# Patient Record
Sex: FEMALE | URBAN - METROPOLITAN AREA
[De-identification: names, ages, dates, MRNs, and addresses within clinical notes are randomized per-mention and may not be internally consistent; named-entity substitution may affect disease eponyms.]

---

## 2019-12-19 ENCOUNTER — APPOINTMENT (RX ONLY)
Dept: URBAN - METROPOLITAN AREA CLINIC 288 | Facility: CLINIC | Age: 49
Setting detail: DERMATOLOGY
End: 2019-12-19

## 2019-12-19 DIAGNOSIS — B07.0 PLANTAR WART: ICD-10-CM

## 2019-12-19 DIAGNOSIS — D18.0 HEMANGIOMA: ICD-10-CM

## 2019-12-19 DIAGNOSIS — L20.89 OTHER ATOPIC DERMATITIS: ICD-10-CM

## 2019-12-19 PROBLEM — L20.84 INTRINSIC (ALLERGIC) ECZEMA: Status: ACTIVE | Noted: 2019-12-19

## 2019-12-19 PROBLEM — D18.01 HEMANGIOMA OF SKIN AND SUBCUTANEOUS TISSUE: Status: ACTIVE | Noted: 2019-12-19

## 2019-12-19 PROCEDURE — 99202 OFFICE O/P NEW SF 15 MIN: CPT | Mod: 25

## 2019-12-19 PROCEDURE — ? FULL BODY SKIN EXAM - DECLINED

## 2019-12-19 PROCEDURE — ? COUNSELING

## 2019-12-19 PROCEDURE — ? CANTHARIDIN

## 2019-12-19 PROCEDURE — 17110 DESTRUCTION B9 LES UP TO 14: CPT

## 2019-12-19 PROCEDURE — ? PRESCRIPTION

## 2019-12-19 RX ORDER — TRIAMCINOLONE ACETONIDE 0.25 MG/G
CREAM TOPICAL
Qty: 3 | Refills: 3 | Status: ERX | COMMUNITY
Start: 2019-12-19

## 2019-12-19 RX ORDER — BETAMETHASONE DIPROPIONATE 0.5 MG/G
CREAM, AUGMENTED TOPICAL
Qty: 2 | Refills: 3 | Status: ERX | COMMUNITY
Start: 2019-12-19

## 2019-12-19 RX ADMIN — TRIAMCINOLONE ACETONIDE: 0.25 CREAM TOPICAL at 00:00

## 2019-12-19 RX ADMIN — BETAMETHASONE DIPROPIONATE: 0.5 CREAM, AUGMENTED TOPICAL at 00:00

## 2019-12-19 ASSESSMENT — LOCATION DETAILED DESCRIPTION DERM
LOCATION DETAILED: GLABELLA
LOCATION DETAILED: LEFT SUPERIOR MEDIAL UPPER BACK
LOCATION DETAILED: RIGHT MEDIAL INFERIOR CHEST
LOCATION DETAILED: RIGHT PLANTAR FOREFOOT OVERLYING 3RD METATARSAL

## 2019-12-19 ASSESSMENT — LOCATION SIMPLE DESCRIPTION DERM
LOCATION SIMPLE: RIGHT PLANTAR SURFACE
LOCATION SIMPLE: GLABELLA
LOCATION SIMPLE: CHEST
LOCATION SIMPLE: LEFT UPPER BACK

## 2019-12-19 ASSESSMENT — LOCATION ZONE DERM
LOCATION ZONE: TRUNK
LOCATION ZONE: FEET
LOCATION ZONE: FACE

## 2019-12-19 NOTE — PROCEDURE: MIPS QUALITY
Quality 130: Documentation Of Current Medications In The Medical Record: Current Medications with Name, Dosage, Frequency, or Route not Documented, Reason not Given
Quality 226: Preventive Care And Screening: Tobacco Use: Screening And Cessation Intervention: Patient screened for tobacco use and is an ex/non-smoker
Detail Level: Detailed
Quality 110: Preventive Care And Screening: Influenza Immunization: Influenza Immunization Administered during Influenza season
Quality 431: Preventive Care And Screening: Unhealthy Alcohol Use - Screening: Patient screened for unhealthy alcohol use using a single question and scores less than 2 times per year
Quality 128: Preventive Care And Screening: Body Mass Index (Bmi) Screening And Follow-Up Plan: BMI not documented, reason not otherwise specified.

## 2019-12-19 NOTE — PROCEDURE: COUNSELING
Detail Level: Simple
Patient Specific Counseling (Will Not Stick From Patient To Patient): If they become infected or become bothersome can return for destruction
Detail Level: Detailed

## 2019-12-19 NOTE — PROCEDURE: CANTHARIDIN
Curette Before Application?: No
Post-Care Instructions: I reviewed with the patient in detail post-care instructions. The patient understands that the treated areas should be washed off 6 to 8 hours after application.
Consent: The patient's consent was obtained including but not limited to risks of crusting, scabbing, scarring, blistering, darker or lighter pigmentary change, recurrence, incomplete removal and infection.
Strength: Dharmesh
Curette Text: Prior to application of cantharidin the lesions were lightly pared with a curette.
Medical Necessity Information: It is in your best interest to select a reason for this procedure from the list below. All of these items fulfill various CMS LCD requirements except the new and changing color options.
Detail Level: Detailed
Medical Necessity Clause: This procedure was medically necessary because the lesions that were treated were:

## 2020-02-17 ENCOUNTER — APPOINTMENT (RX ONLY)
Dept: URBAN - METROPOLITAN AREA CLINIC 288 | Facility: CLINIC | Age: 50
Setting detail: DERMATOLOGY
End: 2020-02-17

## 2020-02-17 DIAGNOSIS — B07.0 PLANTAR WART: ICD-10-CM

## 2020-02-17 PROCEDURE — ? CANTHARIDIN

## 2020-02-17 PROCEDURE — ? FULL BODY SKIN EXAM - DECLINED

## 2020-02-17 PROCEDURE — 17110 DESTRUCTION B9 LES UP TO 14: CPT

## 2020-02-17 ASSESSMENT — LOCATION SIMPLE DESCRIPTION DERM: LOCATION SIMPLE: RIGHT PLANTAR SURFACE

## 2020-02-17 ASSESSMENT — LOCATION DETAILED DESCRIPTION DERM: LOCATION DETAILED: RIGHT PLANTAR FOREFOOT OVERLYING 3RD METATARSAL

## 2020-02-17 ASSESSMENT — LOCATION ZONE DERM: LOCATION ZONE: FEET

## 2020-02-17 NOTE — PROCEDURE: MIPS QUALITY
Quality 226: Preventive Care And Screening: Tobacco Use: Screening And Cessation Intervention: Patient screened for tobacco use and is an ex/non-smoker
Quality 110: Preventive Care And Screening: Influenza Immunization: Influenza Immunization Administered during Influenza season
Quality 431: Preventive Care And Screening: Unhealthy Alcohol Use - Screening: Patient screened for unhealthy alcohol use using a single question and scores less than 2 times per year
Quality 130: Documentation Of Current Medications In The Medical Record: Current Medications with Name, Dosage, Frequency, or Route not Documented, Reason not Given
Detail Level: Detailed
Quality 128: Preventive Care And Screening: Body Mass Index (Bmi) Screening And Follow-Up Plan: BMI not documented, reason not otherwise specified.

## 2020-02-17 NOTE — PROCEDURE: CANTHARIDIN
Medical Necessity Information: It is in your best interest to select a reason for this procedure from the list below. All of these items fulfill various CMS LCD requirements except the new and changing color options.
Consent: The patient's consent was obtained including but not limited to risks of crusting, scabbing, scarring, blistering, darker or lighter pigmentary change, recurrence, incomplete removal and infection.
Curette Before Application?: No
Medical Necessity Clause: This procedure was medically necessary because the lesions that were treated were:
Strength: Dharmesh
Post-Care Instructions: I reviewed with the patient in detail post-care instructions. The patient understands that the treated areas should be washed off 6 to 8 hours after application.
Curette Text: Prior to application of cantharidin the lesions were lightly pared with a curette.
Detail Level: Detailed

## 2020-06-11 ENCOUNTER — APPOINTMENT (RX ONLY)
Dept: URBAN - METROPOLITAN AREA CLINIC 288 | Facility: CLINIC | Age: 50
Setting detail: DERMATOLOGY
End: 2020-06-11

## 2020-06-11 VITALS — TEMPERATURE: 97.5 F

## 2020-06-11 DIAGNOSIS — B07.0 PLANTAR WART: ICD-10-CM

## 2020-06-11 PROCEDURE — ? FULL BODY SKIN EXAM - DECLINED

## 2020-06-11 PROCEDURE — ? BENIGN DESTRUCTION

## 2020-06-11 PROCEDURE — 17110 DESTRUCTION B9 LES UP TO 14: CPT

## 2020-06-11 ASSESSMENT — LOCATION SIMPLE DESCRIPTION DERM: LOCATION SIMPLE: LEFT PLANTAR SURFACE

## 2020-06-11 ASSESSMENT — LOCATION DETAILED DESCRIPTION DERM: LOCATION DETAILED: LEFT MEDIAL PLANTAR HEEL

## 2020-06-11 ASSESSMENT — LOCATION ZONE DERM: LOCATION ZONE: FEET

## 2020-06-11 NOTE — PROCEDURE: MIPS QUALITY
Quality 128: Preventive Care And Screening: Body Mass Index (Bmi) Screening And Follow-Up Plan: BMI not documented, reason not otherwise specified.
Quality 431: Preventive Care And Screening: Unhealthy Alcohol Use - Screening: Patient screened for unhealthy alcohol use using a single question and scores less than 2 times per year
Detail Level: Detailed
Quality 226: Preventive Care And Screening: Tobacco Use: Screening And Cessation Intervention: Patient screened for tobacco use and is an ex/non-smoker
Quality 130: Documentation Of Current Medications In The Medical Record: Current Medications with Name, Dosage, Frequency, or Route not Documented, Reason not Given
Quality 110: Preventive Care And Screening: Influenza Immunization: Influenza Immunization Administered during Influenza season

## 2020-06-11 NOTE — PROCEDURE: BENIGN DESTRUCTION
Detail Level: Detailed
Anesthesia Volume In Cc: 0.5
Post-Care Instructions: I reviewed with the patient in detail post-care instructions. Patient is to wear sunprotection, and avoid picking at any of the treated lesions. Pt may apply Vaseline to crusted or scabbing areas.
Render Post-Care Instructions In Note?: no
Medical Necessity Clause: This procedure was medically necessary because the lesions that were treated were:
Consent: The patient's consent was obtained including but not limited to risks of crusting, scabbing, blistering, scarring, darker or lighter pigmentary change, recurrence, incomplete removal and infection.
Medical Necessity Information: It is in your best interest to select a reason for this procedure from the list below. All of these items fulfill various CMS LCD requirements except the new and changing color options.
Treatment Number (Will Not Render If 0): 1

## 2020-07-14 ENCOUNTER — APPOINTMENT (RX ONLY)
Dept: URBAN - METROPOLITAN AREA CLINIC 288 | Facility: CLINIC | Age: 50
Setting detail: DERMATOLOGY
End: 2020-07-14

## 2020-07-14 VITALS — TEMPERATURE: 96.3 F

## 2020-07-14 DIAGNOSIS — L71.0 PERIORAL DERMATITIS: ICD-10-CM

## 2020-07-14 PROCEDURE — ? PRESCRIPTION

## 2020-07-14 PROCEDURE — ? FULL BODY SKIN EXAM - DECLINED

## 2020-07-14 PROCEDURE — ? COUNSELING

## 2020-07-14 PROCEDURE — 99213 OFFICE O/P EST LOW 20 MIN: CPT

## 2020-07-14 RX ORDER — DESONIDE 0.5 MG/G
CREAM TOPICAL BID
Qty: 1 | Refills: 2 | Status: ERX | COMMUNITY
Start: 2020-07-14

## 2020-07-14 RX ORDER — MINOCYCLINE HYDROCHLORIDE 100 MG/1
TABLET ORAL BID
Qty: 60 | Refills: 1 | Status: ERX | COMMUNITY
Start: 2020-07-14

## 2020-07-14 RX ADMIN — MINOCYCLINE HYDROCHLORIDE: 100 TABLET ORAL at 00:00

## 2020-07-14 RX ADMIN — DESONIDE: 0.5 CREAM TOPICAL at 00:00

## 2020-07-14 ASSESSMENT — LOCATION ZONE DERM: LOCATION ZONE: FACE

## 2020-07-14 ASSESSMENT — LOCATION DETAILED DESCRIPTION DERM: LOCATION DETAILED: RIGHT SUPERIOR CENTRAL MALAR CHEEK

## 2020-07-14 ASSESSMENT — LOCATION SIMPLE DESCRIPTION DERM: LOCATION SIMPLE: RIGHT CHEEK

## 2020-07-14 NOTE — PROCEDURE: MIPS QUALITY
Quality 431: Preventive Care And Screening: Unhealthy Alcohol Use - Screening: Patient screened for unhealthy alcohol use using a single question and scores less than 2 times per year
Quality 110: Preventive Care And Screening: Influenza Immunization: Influenza Immunization Administered during Influenza season
Quality 226: Preventive Care And Screening: Tobacco Use: Screening And Cessation Intervention: Patient screened for tobacco use and is an ex/non-smoker
Quality 128: Preventive Care And Screening: Body Mass Index (Bmi) Screening And Follow-Up Plan: BMI not documented, reason not otherwise specified.
Quality 130: Documentation Of Current Medications In The Medical Record: Current Medications Documented
Detail Level: Detailed

## 2020-08-11 ENCOUNTER — APPOINTMENT (RX ONLY)
Dept: URBAN - METROPOLITAN AREA CLINIC 288 | Facility: CLINIC | Age: 50
Setting detail: DERMATOLOGY
End: 2020-08-11

## 2020-08-11 VITALS — TEMPERATURE: 96.7 F

## 2020-08-11 DIAGNOSIS — L71.0 PERIORAL DERMATITIS: ICD-10-CM | Status: IMPROVED

## 2020-08-11 PROCEDURE — 99212 OFFICE O/P EST SF 10 MIN: CPT

## 2020-08-11 PROCEDURE — ? ADDITIONAL NOTES

## 2020-08-11 PROCEDURE — ? PRESCRIPTION MEDICATION MANAGEMENT

## 2020-08-11 PROCEDURE — ? PRESCRIPTION

## 2020-08-11 PROCEDURE — ? FULL BODY SKIN EXAM - DECLINED

## 2020-08-11 PROCEDURE — ? COUNSELING

## 2020-08-11 RX ORDER — MINOCYCLINE HYDROCHLORIDE 100 MG/1
TABLET ORAL BID
Qty: 60 | Refills: 1 | Status: CANCELLED
Stop reason: CLARIF

## 2020-08-11 ASSESSMENT — LOCATION DETAILED DESCRIPTION DERM: LOCATION DETAILED: RIGHT SUPERIOR CENTRAL MALAR CHEEK

## 2020-08-11 ASSESSMENT — LOCATION SIMPLE DESCRIPTION DERM: LOCATION SIMPLE: RIGHT CHEEK

## 2020-08-11 ASSESSMENT — LOCATION ZONE DERM: LOCATION ZONE: FACE

## 2020-08-11 NOTE — PROCEDURE: PRESCRIPTION MEDICATION MANAGEMENT
Continue Regimen: Minocycline 100 mg BID for two more weeks then go down for 1 QD.
Render In Strict Bullet Format?: No
Detail Level: Zone

## 2020-08-11 NOTE — PROCEDURE: MIPS QUALITY
Quality 226: Preventive Care And Screening: Tobacco Use: Screening And Cessation Intervention: Patient screened for tobacco use and is an ex/non-smoker
Quality 130: Documentation Of Current Medications In The Medical Record: Current Medications Documented
Detail Level: Detailed
Quality 431: Preventive Care And Screening: Unhealthy Alcohol Use - Screening: Patient screened for unhealthy alcohol use using a single question and scores less than 2 times per year
Quality 128: Preventive Care And Screening: Body Mass Index (Bmi) Screening And Follow-Up Plan: BMI not documented, reason not otherwise specified.

## 2020-08-18 ENCOUNTER — APPOINTMENT (RX ONLY)
Dept: URBAN - METROPOLITAN AREA CLINIC 288 | Facility: CLINIC | Age: 50
Setting detail: DERMATOLOGY
End: 2020-08-18

## 2020-08-18 VITALS — TEMPERATURE: 96.2 F

## 2020-08-18 DIAGNOSIS — L82.1 OTHER SEBORRHEIC KERATOSIS: ICD-10-CM

## 2020-08-18 DIAGNOSIS — L81.4 OTHER MELANIN HYPERPIGMENTATION: ICD-10-CM

## 2020-08-18 DIAGNOSIS — L71.0 PERIORAL DERMATITIS: ICD-10-CM | Status: RESOLVED

## 2020-08-18 DIAGNOSIS — L20.89 OTHER ATOPIC DERMATITIS: ICD-10-CM | Status: STABLE

## 2020-08-18 DIAGNOSIS — D22 MELANOCYTIC NEVI: ICD-10-CM

## 2020-08-18 DIAGNOSIS — D18.0 HEMANGIOMA: ICD-10-CM

## 2020-08-18 PROBLEM — D18.01 HEMANGIOMA OF SKIN AND SUBCUTANEOUS TISSUE: Status: ACTIVE | Noted: 2020-08-18

## 2020-08-18 PROBLEM — L20.84 INTRINSIC (ALLERGIC) ECZEMA: Status: ACTIVE | Noted: 2020-08-18

## 2020-08-18 PROBLEM — D22.5 MELANOCYTIC NEVI OF TRUNK: Status: ACTIVE | Noted: 2020-08-18

## 2020-08-18 PROCEDURE — 99214 OFFICE O/P EST MOD 30 MIN: CPT

## 2020-08-18 PROCEDURE — ? COUNSELING

## 2020-08-18 PROCEDURE — ? MEDICATION COUNSELING

## 2020-08-18 PROCEDURE — ? PRESCRIPTION

## 2020-08-18 PROCEDURE — ? FULL BODY SKIN EXAM

## 2020-08-18 RX ORDER — HYDROCORTISONE 25 MG/G
CREAM TOPICAL BID
Qty: 1 | Refills: 2 | Status: ERX | COMMUNITY
Start: 2020-08-18

## 2020-08-18 RX ADMIN — HYDROCORTISONE: 25 CREAM TOPICAL at 00:00

## 2020-08-18 ASSESSMENT — LOCATION SIMPLE DESCRIPTION DERM
LOCATION SIMPLE: CHEST
LOCATION SIMPLE: ABDOMEN
LOCATION SIMPLE: RIGHT SUPERIOR EYELID
LOCATION SIMPLE: RIGHT CHEEK
LOCATION SIMPLE: LEFT UPPER BACK
LOCATION SIMPLE: LEFT LOWER BACK

## 2020-08-18 ASSESSMENT — LOCATION DETAILED DESCRIPTION DERM
LOCATION DETAILED: RIGHT MEDIAL SUPERIOR EYELID
LOCATION DETAILED: RIGHT SUPERIOR CENTRAL MALAR CHEEK
LOCATION DETAILED: LEFT SUPERIOR LATERAL MIDBACK
LOCATION DETAILED: LEFT MEDIAL SUPERIOR CHEST
LOCATION DETAILED: LEFT MID-UPPER BACK
LOCATION DETAILED: PERIUMBILICAL SKIN

## 2020-08-18 ASSESSMENT — LOCATION ZONE DERM
LOCATION ZONE: TRUNK
LOCATION ZONE: EYELID
LOCATION ZONE: FACE

## 2020-08-18 NOTE — PROCEDURE: MEDICATION COUNSELING
Xellorainez Pregnancy And Lactation Text: This medication is Pregnancy Category D and is not considered safe during pregnancy.  The risk during breast feeding is also uncertain.

## 2020-08-18 NOTE — PROCEDURE: MEDICATION COUNSELING
Health Maintenance Summary     Topic Due On Due Status Completed On    MAMMOGRAM - BREAST CANCER SCREENING Mar 21, 2018 Not Due Mar 21, 2016    Colorectal Cancer Screening - Colonoscopy Aug 29, 2021 Not Due Aug 29, 2011    Immunization - TDAP Pregnancy  Hidden     IMMUNIZATION - DTaP/Tdap/Td Nov 23, 2020 Not Due Nov 23, 2010    Immunization-Influenza Sep 1, 2017 Not Due Sep 29, 2014          Patient is up to date, no discussion needed .       Finasteride Male Counseling: Finasteride Counseling:  I discussed with the patient the risks of use of finasteride including but not limited to decreased libido, decreased ejaculate volume, gynecomastia, and depression. Women should not handle medication.  All of the patient's questions and concerns were addressed. Finasteride Counseling:  I discussed with the patient the risks of use of finasteride including but not limited to decreased libido, decreased ejaculate volume, gynecomastia, and depression. Women should not handle medication.  All of the patient's questions and concerns were addressed.

## 2021-03-29 RX ORDER — BETAMETHASONE DIPROPIONATE 0.5 MG/G
CREAM, AUGMENTED TOPICAL
Qty: 2 | Refills: 0 | Status: ERX

## 2021-03-29 RX ORDER — TRIAMCINOLONE ACETONIDE 0.25 MG/G
CREAM TOPICAL
Qty: 3 | Refills: 0 | Status: ERX

## 2021-09-10 ENCOUNTER — APPOINTMENT (RX ONLY)
Dept: URBAN - METROPOLITAN AREA CLINIC 288 | Facility: CLINIC | Age: 51
Setting detail: DERMATOLOGY
End: 2021-09-10

## 2021-09-10 DIAGNOSIS — L82.1 OTHER SEBORRHEIC KERATOSIS: ICD-10-CM

## 2021-09-10 DIAGNOSIS — D18.0 HEMANGIOMA: ICD-10-CM

## 2021-09-10 DIAGNOSIS — I83.9 ASYMPTOMATIC VARICOSE VEINS OF LOWER EXTREMITIES: ICD-10-CM

## 2021-09-10 DIAGNOSIS — L81.4 OTHER MELANIN HYPERPIGMENTATION: ICD-10-CM

## 2021-09-10 DIAGNOSIS — D22 MELANOCYTIC NEVI: ICD-10-CM

## 2021-09-10 PROBLEM — I83.92 ASYMPTOMATIC VARICOSE VEINS OF LEFT LOWER EXTREMITY: Status: ACTIVE | Noted: 2021-09-10

## 2021-09-10 PROBLEM — D18.01 HEMANGIOMA OF SKIN AND SUBCUTANEOUS TISSUE: Status: ACTIVE | Noted: 2021-09-10

## 2021-09-10 PROBLEM — D22.5 MELANOCYTIC NEVI OF TRUNK: Status: ACTIVE | Noted: 2021-09-10

## 2021-09-10 PROCEDURE — 99213 OFFICE O/P EST LOW 20 MIN: CPT

## 2021-09-10 PROCEDURE — ? SCREENING FOR COVID-19

## 2021-09-10 PROCEDURE — ? SUNSCREEN RECOMMENDATIONS

## 2021-09-10 PROCEDURE — ? COUNSELING

## 2021-09-10 PROCEDURE — ? FULL BODY SKIN EXAM

## 2021-09-10 PROCEDURE — ? TREATMENT REGIMEN

## 2021-09-10 ASSESSMENT — LOCATION SIMPLE DESCRIPTION DERM
LOCATION SIMPLE: UPPER BACK
LOCATION SIMPLE: RIGHT UPPER BACK
LOCATION SIMPLE: LEFT UPPER BACK
LOCATION SIMPLE: LEFT PRETIBIAL REGION
LOCATION SIMPLE: ABDOMEN

## 2021-09-10 ASSESSMENT — LOCATION DETAILED DESCRIPTION DERM
LOCATION DETAILED: EPIGASTRIC SKIN
LOCATION DETAILED: LEFT INFERIOR UPPER BACK
LOCATION DETAILED: LEFT PROXIMAL PRETIBIAL REGION
LOCATION DETAILED: SUPERIOR THORACIC SPINE
LOCATION DETAILED: RIGHT SUPERIOR MEDIAL UPPER BACK

## 2021-09-10 ASSESSMENT — LOCATION ZONE DERM
LOCATION ZONE: LEG
LOCATION ZONE: TRUNK

## 2021-09-10 NOTE — PROCEDURE: MIPS QUALITY
Quality 431: Preventive Care And Screening: Unhealthy Alcohol Use - Screening: Patient not identified as an unhealthy alcohol user when screened for unhealthy alcohol use using a systematic screening method
Detail Level: Detailed
Quality 111:Pneumonia Vaccination Status For Older Adults: Pneumococcal Vaccination Previously Received
Quality 110: Preventive Care And Screening: Influenza Immunization: Influenza Immunization previously received during influenza season
Quality 226: Preventive Care And Screening: Tobacco Use: Screening And Cessation Intervention: Patient screened for tobacco use and is an ex/non-smoker

## 2021-12-23 ENCOUNTER — RX ONLY (OUTPATIENT)
Age: 51
Setting detail: RX ONLY
End: 2021-12-23

## 2021-12-23 RX ORDER — BETAMETHASONE DIPROPIONATE 0.5 MG/G
CREAM, AUGMENTED TOPICAL
Qty: 30 | Refills: 0 | Status: ERX

## 2022-03-30 NOTE — PROCEDURE: MEDICATION COUNSELING
Ok noted   Rituxan Counseling:  I discussed with the patient the risks of Rituxan infusions. Side effects can include infusion reactions, severe drug rashes including mucocutaneous reactions, reactivation of latent hepatitis and other infections and rarely progressive multifocal leukoencephalopathy.  All of the patient's questions and concerns were addressed.

## 2023-02-16 ENCOUNTER — APPOINTMENT (RX ONLY)
Dept: URBAN - METROPOLITAN AREA CLINIC 288 | Facility: CLINIC | Age: 53
Setting detail: DERMATOLOGY
End: 2023-02-16

## 2023-02-16 DIAGNOSIS — L71.8 OTHER ROSACEA: ICD-10-CM | Status: STABLE

## 2023-02-16 DIAGNOSIS — L57.8 OTHER SKIN CHANGES DUE TO CHRONIC EXPOSURE TO NONIONIZING RADIATION: ICD-10-CM | Status: STABLE

## 2023-02-16 DIAGNOSIS — E85.4 ORGAN-LIMITED AMYLOIDOSIS: ICD-10-CM | Status: STABLE

## 2023-02-16 PROCEDURE — ? ADDITIONAL NOTES

## 2023-02-16 PROCEDURE — ? SUNSCREEN RECOMMENDATIONS

## 2023-02-16 PROCEDURE — 99214 OFFICE O/P EST MOD 30 MIN: CPT

## 2023-02-16 PROCEDURE — ? COUNSELING

## 2023-02-16 PROCEDURE — ? PRESCRIPTION

## 2023-02-16 PROCEDURE — ? FULL BODY SKIN EXAM

## 2023-02-16 RX ORDER — BETAMETHASONE DIPROPIONATE 0.5 MG/G
CREAM, AUGMENTED TOPICAL BID
Qty: 50 | Refills: 3 | Status: ERX | COMMUNITY
Start: 2023-02-16

## 2023-02-16 RX ADMIN — BETAMETHASONE DIPROPIONATE: 0.5 CREAM, AUGMENTED TOPICAL at 00:00

## 2023-02-16 ASSESSMENT — LOCATION SIMPLE DESCRIPTION DERM
LOCATION SIMPLE: LOWER BACK
LOCATION SIMPLE: LEFT LOWER BACK
LOCATION SIMPLE: RIGHT LOWER BACK
LOCATION SIMPLE: NOSE

## 2023-02-16 ASSESSMENT — LOCATION DETAILED DESCRIPTION DERM
LOCATION DETAILED: LEFT INFERIOR LATERAL MIDBACK
LOCATION DETAILED: RIGHT INFERIOR LATERAL MIDBACK
LOCATION DETAILED: INFERIOR LUMBAR SPINE
LOCATION DETAILED: NASAL DORSUM

## 2023-02-16 ASSESSMENT — LOCATION ZONE DERM
LOCATION ZONE: TRUNK
LOCATION ZONE: NOSE

## 2023-02-16 NOTE — PROCEDURE: ADDITIONAL NOTES
Additional Notes: Recommend good moisturizer
Detail Level: Simple
Render Risk Assessment In Note?: no

## 2023-02-28 ENCOUNTER — RX ONLY (OUTPATIENT)
Age: 53
Setting detail: RX ONLY
End: 2023-02-28

## 2023-02-28 RX ORDER — OXYMETAZOLINE HYDROCHLORIDE 1 G/100G
CREAM TOPICAL
Qty: 30 | Refills: 3 | Status: ERX | COMMUNITY
Start: 2023-02-28

## 2024-01-29 NOTE — PROCEDURE: ADDITIONAL NOTES
Please see Dejah Cerna R.D. at [facility] in [place]   Please call to make an appointment at [phone]    
Detail Level: Simple
Additional Notes: Desonide cream was not covered by insurance so she did not pick this up.  Area is much improved; no papules around eyes, nose, or mouth.

## 2024-10-15 ENCOUNTER — APPOINTMENT (RX ONLY)
Dept: URBAN - METROPOLITAN AREA CLINIC 288 | Facility: CLINIC | Age: 54
Setting detail: DERMATOLOGY
End: 2024-10-15

## 2024-10-15 DIAGNOSIS — D18.0 HEMANGIOMA: ICD-10-CM | Status: STABLE

## 2024-10-15 DIAGNOSIS — D22 MELANOCYTIC NEVI: ICD-10-CM | Status: STABLE

## 2024-10-15 DIAGNOSIS — D485 NEOPLASM OF UNCERTAIN BEHAVIOR OF SKIN: ICD-10-CM | Status: INADEQUATELY CONTROLLED

## 2024-10-15 DIAGNOSIS — L57.0 ACTINIC KERATOSIS: ICD-10-CM | Status: INADEQUATELY CONTROLLED

## 2024-10-15 DIAGNOSIS — L81.4 OTHER MELANIN HYPERPIGMENTATION: ICD-10-CM | Status: STABLE

## 2024-10-15 DIAGNOSIS — L82.1 OTHER SEBORRHEIC KERATOSIS: ICD-10-CM | Status: STABLE

## 2024-10-15 PROBLEM — D22.5 MELANOCYTIC NEVI OF TRUNK: Status: ACTIVE | Noted: 2024-10-15

## 2024-10-15 PROBLEM — D18.01 HEMANGIOMA OF SKIN AND SUBCUTANEOUS TISSUE: Status: ACTIVE | Noted: 2024-10-15

## 2024-10-15 PROBLEM — D48.5 NEOPLASM OF UNCERTAIN BEHAVIOR OF SKIN: Status: ACTIVE | Noted: 2024-10-15

## 2024-10-15 PROCEDURE — ? FULL BODY SKIN EXAM

## 2024-10-15 PROCEDURE — ? BIOPSY BY SHAVE METHOD

## 2024-10-15 PROCEDURE — 17000 DESTRUCT PREMALG LESION: CPT | Mod: 59

## 2024-10-15 PROCEDURE — 99213 OFFICE O/P EST LOW 20 MIN: CPT | Mod: 25

## 2024-10-15 PROCEDURE — ? TREATMENT REGIMEN

## 2024-10-15 PROCEDURE — ? COUNSELING

## 2024-10-15 PROCEDURE — 11102 TANGNTL BX SKIN SINGLE LES: CPT

## 2024-10-15 PROCEDURE — ? LIQUID NITROGEN

## 2024-10-15 ASSESSMENT — LOCATION SIMPLE DESCRIPTION DERM
LOCATION SIMPLE: RIGHT FOREARM
LOCATION SIMPLE: LEFT UPPER BACK
LOCATION SIMPLE: CHEST
LOCATION SIMPLE: LEFT LOWER BACK
LOCATION SIMPLE: LEFT CHEEK
LOCATION SIMPLE: RIGHT UPPER BACK
LOCATION SIMPLE: ABDOMEN
LOCATION SIMPLE: LEFT FOREARM

## 2024-10-15 ASSESSMENT — LOCATION DETAILED DESCRIPTION DERM
LOCATION DETAILED: RIGHT MEDIAL SUPERIOR CHEST
LOCATION DETAILED: RIGHT MID-UPPER BACK
LOCATION DETAILED: LEFT PROXIMAL DORSAL FOREARM
LOCATION DETAILED: UPPER STERNUM
LOCATION DETAILED: LEFT SUPERIOR UPPER BACK
LOCATION DETAILED: RIGHT DISTAL DORSAL FOREARM
LOCATION DETAILED: LEFT SUPERIOR LATERAL MIDBACK
LOCATION DETAILED: MIDDLE STERNUM
LOCATION DETAILED: LEFT CENTRAL MALAR CHEEK
LOCATION DETAILED: RIGHT SUPERIOR UPPER BACK
LOCATION DETAILED: PERIUMBILICAL SKIN

## 2024-10-15 ASSESSMENT — LOCATION ZONE DERM
LOCATION ZONE: TRUNK
LOCATION ZONE: FACE
LOCATION ZONE: ARM

## 2024-10-15 NOTE — PROCEDURE: LIQUID NITROGEN
Application Tool (Optional): Liquid Nitrogen Sprayer
Show Applicator Variable?: Yes
Number Of Freeze-Thaw Cycles: 2 freeze-thaw cycles
Render Note In Bullet Format When Appropriate: No
Detail Level: Detailed
Post-Care Instructions: I reviewed with the patient in detail post-care instructions. Patient is to wear sunprotection, and avoid picking at any of the treated lesions. Pt may apply Vaseline to crusted or scabbing areas.
Consent: The patient's consent was obtained including but not limited to risks of crusting, scabbing, blistering, scarring, darker or lighter pigmentary change, recurrence, incomplete removal and infection.